# Patient Record
Sex: MALE | Race: OTHER | HISPANIC OR LATINO | Employment: FULL TIME | URBAN - METROPOLITAN AREA
[De-identification: names, ages, dates, MRNs, and addresses within clinical notes are randomized per-mention and may not be internally consistent; named-entity substitution may affect disease eponyms.]

---

## 2019-12-30 ENCOUNTER — OFFICE VISIT (OUTPATIENT)
Dept: UROLOGY | Facility: MEDICAL CENTER | Age: 24
End: 2019-12-30

## 2019-12-30 VITALS
WEIGHT: 177 LBS | SYSTOLIC BLOOD PRESSURE: 136 MMHG | BODY MASS INDEX: 27.78 KG/M2 | HEART RATE: 65 BPM | DIASTOLIC BLOOD PRESSURE: 90 MMHG | HEIGHT: 67 IN

## 2019-12-30 DIAGNOSIS — N45.1 EPIDIDYMITIS: ICD-10-CM

## 2019-12-30 DIAGNOSIS — N48.9 PENILE LESION: Primary | ICD-10-CM

## 2019-12-30 PROCEDURE — 99204 OFFICE O/P NEW MOD 45 MIN: CPT | Performed by: UROLOGY

## 2019-12-30 RX ORDER — DOXYCYCLINE HYCLATE 100 MG/1
100 CAPSULE ORAL EVERY 12 HOURS SCHEDULED
Qty: 20 CAPSULE | Refills: 0 | Status: SHIPPED | OUTPATIENT
Start: 2019-12-30 | End: 2020-01-09

## 2020-01-03 NOTE — PROGRESS NOTES
HISTORY:    Patient has small red areas on the penile shaft which appear to be quite dry, two 3 mm in diameter, as they heal the skin flakes off  No ulcers, drainage, pain, infection  No prior  history, but intermittent left scrotal pain also for six months            ASSESSMENT / PLAN:    Unclear cause of this nonspecific inflammatory condition  Patient has been putting hydrocortisone, and he says it is about 50% better  He will continue doing that about one more week, it is still present he will need Dermatology evaluation, he has seen them before  Doxycycline for possible epididymitis  I am not sure if this is true diagnosis because pain is been there six months, but he is tender, so will treat as such  The following portions of the patient's history were reviewed and updated as appropriate: allergies, current medications, past family history, past medical history, past social history, past surgical history and problem list     Review of Systems   All other systems reviewed and are negative  Objective:     Physical Exam   Constitutional: He appears well-developed and well-nourished  Genitourinary:   Genitourinary Comments: Penis testes carefully examined:       patchy erythematous areas on  penile skin as described above    Testes normal, mild thickening upper pole left epididymis possible epididymitis, although has been there for six months  No results found for: PSA]  No results found for: BUN  No results found for: CREATININE  No components found for: CBC      Patient Active Problem List   Diagnosis    Penile lesion    Epididymitis        Diagnoses and all orders for this visit:    Penile lesion    Epididymitis  -     doxycycline hyclate (VIBRAMYCIN) 100 mg capsule; Take 1 capsule (100 mg total) by mouth every 12 (twelve) hours for 10 days           Patient ID: Mike Ulloa is a 25 y o  male        Current Outpatient Medications:     doxycycline hyclate (VIBRAMYCIN) 100 mg capsule, Take 1 capsule (100 mg total) by mouth every 12 (twelve) hours for 10 days, Disp: 20 capsule, Rfl: 0    History reviewed  No pertinent past medical history  History reviewed  No pertinent surgical history      Social History

## 2020-04-02 ENCOUNTER — NURSE TRIAGE (OUTPATIENT)
Dept: OTHER | Facility: OTHER | Age: 25
End: 2020-04-02

## 2020-04-02 ENCOUNTER — TELEPHONE (OUTPATIENT)
Dept: LAB | Facility: HOSPITAL | Age: 25
End: 2020-04-02

## 2021-08-08 ENCOUNTER — HOSPITAL ENCOUNTER (EMERGENCY)
Facility: HOSPITAL | Age: 26
Discharge: HOME | End: 2021-08-08
Attending: EMERGENCY MEDICINE

## 2021-08-08 VITALS
SYSTOLIC BLOOD PRESSURE: 139 MMHG | RESPIRATION RATE: 15 BRPM | OXYGEN SATURATION: 98 % | BODY MASS INDEX: 27.7 KG/M2 | HEART RATE: 89 BPM | HEIGHT: 69 IN | TEMPERATURE: 98.4 F | DIASTOLIC BLOOD PRESSURE: 89 MMHG | WEIGHT: 187 LBS

## 2021-08-08 DIAGNOSIS — S09.93XA DENTAL INJURY, INITIAL ENCOUNTER: Primary | ICD-10-CM

## 2021-08-08 PROCEDURE — 99284 EMERGENCY DEPT VISIT MOD MDM: CPT | Performed by: EMERGENCY MEDICINE

## 2021-08-08 PROCEDURE — 99282 EMERGENCY DEPT VISIT SF MDM: CPT

## 2021-08-08 RX ORDER — IBUPROFEN 600 MG/1
600 TABLET ORAL ONCE
Status: COMPLETED | OUTPATIENT
Start: 2021-08-08 | End: 2021-08-08

## 2021-08-08 RX ORDER — OXYCODONE HYDROCHLORIDE 5 MG/1
5 TABLET ORAL EVERY 6 HOURS PRN
Qty: 6 TABLET | Refills: 0 | Status: SHIPPED | OUTPATIENT
Start: 2021-08-08 | End: 2021-08-08 | Stop reason: ALTCHOICE

## 2021-08-08 RX ORDER — OXYCODONE HYDROCHLORIDE 5 MG/1
5 TABLET ORAL ONCE
Status: COMPLETED | OUTPATIENT
Start: 2021-08-08 | End: 2021-08-08

## 2021-08-08 RX ORDER — OXYCODONE HYDROCHLORIDE 5 MG/1
5 TABLET ORAL EVERY 6 HOURS PRN
Qty: 6 TABLET | Refills: 0 | Status: SHIPPED | OUTPATIENT
Start: 2021-08-08 | End: 2021-08-11

## 2021-08-08 RX ADMIN — IBUPROFEN 600 MG: 600 TABLET, FILM COATED ORAL at 19:36

## 2021-08-08 RX ADMIN — OXYCODONE 5 MG: 5 TABLET ORAL at 19:36
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
## 2021-08-08 NOTE — DISCHARGE INSTRUCTIONS
Follow up with dentistry in the AM, and return to the emergency department for new or worsening symptoms

## 2024-09-06 ENCOUNTER — HOSPITAL ENCOUNTER (EMERGENCY)
Facility: HOSPITAL | Age: 29
Discharge: HOME | End: 2024-09-07
Attending: EMERGENCY MEDICINE
Payer: COMMERCIAL

## 2024-09-06 VITALS
OXYGEN SATURATION: 100 % | TEMPERATURE: 98.1 F | RESPIRATION RATE: 16 BRPM | DIASTOLIC BLOOD PRESSURE: 84 MMHG | HEART RATE: 66 BPM | SYSTOLIC BLOOD PRESSURE: 140 MMHG

## 2024-09-06 DIAGNOSIS — S05.00XA CONJUNCTIVAL ABRASION: Primary | ICD-10-CM

## 2024-09-06 PROCEDURE — 99282 EMERGENCY DEPT VISIT SF MDM: CPT

## 2024-09-07 PROCEDURE — 99284 EMERGENCY DEPT VISIT MOD MDM: CPT | Performed by: EMERGENCY MEDICINE

## 2024-09-07 RX ORDER — CIPROFLOXACIN HYDROCHLORIDE 3.5 MG/ML
1 SOLUTION/ DROPS TOPICAL
Qty: 5 ML | Refills: 0 | Status: SHIPPED | OUTPATIENT
Start: 2024-09-07 | End: 2024-09-07

## 2024-09-07 RX ORDER — TETRACAINE HYDROCHLORIDE 5 MG/ML
1 SOLUTION OPHTHALMIC ONCE
Status: COMPLETED | OUTPATIENT
Start: 2024-09-07 | End: 2024-09-07

## 2024-09-07 RX ORDER — CIPROFLOXACIN HYDROCHLORIDE 3.5 MG/ML
1 SOLUTION/ DROPS TOPICAL
Qty: 4.2 ML | Refills: 0 | Status: SHIPPED | OUTPATIENT
Start: 2024-09-07 | End: 2024-09-14

## 2024-09-07 RX ADMIN — TETRACAINE HYDROCHLORIDE 1 DROP: 5 SOLUTION OPHTHALMIC at 00:53

## 2024-09-07 RX ADMIN — FLUORESCEIN SODIUM 1 STRIP: 1 STRIP OPHTHALMIC at 00:53

## 2024-09-07 NOTE — DISCHARGE INSTRUCTIONS
You were seen in the emergency department for right eye pain.  There is no foreign body in your eye.  It appears as if you scratched the top layer of your eye.    A prescription for antibiotic eyedrops has been sent to [facility]. Please use 1 drop in the right eye every 2 hours that you are awake.     A referral for an eye doctor has been placed. Please call to make an appointment.    Immediately return to the emergency department if you develop a fever, increased pain or swelling, vision changes, or any new or concerning symptoms.

## 2024-09-07 NOTE — ED PROVIDER NOTES
History  Chief Complaint   Patient presents with    Eye Pain     Pt reports cutting wood this morning and got something in his right eye. Swelling and redness noted in triage     29-year-old previously healthy male presents for right eye pain and swelling.  He was chopping wood this morning when he felt something hit his eye.  He states he did pull off a piece of wood that was on his eye.  The pain and the swelling worsened throughout the day which prompted him to come in.  He has had no changes to his vision.  Denies wearing contact lenses.      Eye Pain  Associated symptoms: no abdominal pain, no chest pain, no cough, no ear pain, no fever, no headaches, no rash, no shortness of breath, no sore throat and no vomiting        None       History reviewed. No pertinent past medical history.    History reviewed. No pertinent surgical history.    History reviewed. No pertinent family history.  I have reviewed and agree with the history as documented.    E-Cigarette/Vaping    E-Cigarette Use Never User      E-Cigarette/Vaping Substances     Social History     Tobacco Use    Smoking status: Former     Current packs/day: 0.00     Types: Cigarettes     Quit date: 2015     Years since quittin.6    Smokeless tobacco: Never   Vaping Use    Vaping status: Never Used   Substance Use Topics    Alcohol use: Yes    Drug use: Never        Review of Systems   Constitutional:  Negative for chills and fever.   HENT:  Negative for ear pain and sore throat.    Eyes:  Positive for pain and redness. Negative for photophobia and visual disturbance.   Respiratory:  Negative for cough and shortness of breath.    Cardiovascular:  Negative for chest pain and palpitations.   Gastrointestinal:  Negative for abdominal pain and vomiting.   Genitourinary:  Negative for dysuria and hematuria.   Musculoskeletal:  Negative for arthralgias and back pain.   Skin:  Negative for color change and rash.   Neurological:  Negative for seizures,  syncope and headaches.   All other systems reviewed and are negative.      Physical Exam  ED Triage Vitals [09/06/24 2328]   Temperature Pulse Respirations Blood Pressure SpO2   98.1 °F (36.7 °C) 66 16 140/84 100 %      Temp Source Heart Rate Source Patient Position - Orthostatic VS BP Location FiO2 (%)   Temporal Monitor Sitting Left arm --      Pain Score       5             Orthostatic Vital Signs  Vitals:    09/06/24 2328   BP: 140/84   Pulse: 66   Patient Position - Orthostatic VS: Sitting       Physical Exam  Vitals and nursing note reviewed.   Constitutional:       General: He is not in acute distress.     Appearance: He is well-developed.   HENT:      Head: Normocephalic and atraumatic.   Eyes:      General: Lids are everted, no foreign bodies appreciated. Vision grossly intact.         Right eye: No discharge.         Left eye: No foreign body or discharge.      Extraocular Movements: Extraocular movements intact.      Conjunctiva/sclera:      Right eye: Right conjunctiva is injected. No exudate or hemorrhage.     Slit lamp exam:     Right eye: No corneal flare, corneal ulcer, foreign body, hyphema or hypopyon.        Comments: Patient has hyperpigmentation on sclera of his right eye, which he states has been there chronically; lateral aspect of sclera is injected; no foreign object visualized under lids, right eyelid is swollen   Cardiovascular:      Rate and Rhythm: Normal rate and regular rhythm.      Heart sounds: No murmur heard.  Pulmonary:      Effort: Pulmonary effort is normal. No respiratory distress.      Breath sounds: Normal breath sounds.   Abdominal:      Palpations: Abdomen is soft.      Tenderness: There is no abdominal tenderness.   Musculoskeletal:         General: No swelling.      Cervical back: Neck supple.   Skin:     General: Skin is warm and dry.      Capillary Refill: Capillary refill takes less than 2 seconds.   Neurological:      Mental Status: He is alert.   Psychiatric:          Mood and Affect: Mood normal.         ED Medications  Medications   tetracaine 0.5 % ophthalmic solution 1 drop (1 drop Right Eye Given by Other 9/7/24 0053)   fluorescein sodium sterile ophthalmic strip 1 strip (1 strip Right Eye Given by Other 9/7/24 0053)       Diagnostic Studies  Results Reviewed       None                   No orders to display         Procedures  Procedures      ED Course                             SBIRT 20yo+      Flowsheet Row Most Recent Value   Initial Alcohol Screen: US AUDIT-C     1. How often do you have a drink containing alcohol? 0 Filed at: 09/06/2024 2329   2. How many drinks containing alcohol do you have on a typical day you are drinking?  0 Filed at: 09/06/2024 2329   3a. Male UNDER 65: How often do you have five or more drinks on one occasion? 0 Filed at: 09/06/2024 2329   Audit-C Score 0 Filed at: 09/06/2024 2329   MIRIAM: How many times in the past year have you...    Used an illegal drug or used a prescription medication for non-medical reasons? Never Filed at: 09/06/2024 2329                  Medical Decision Making  Patient is a 29 y.o. male who presents to the ED with right eye pain.    Vital signs within normal limits. On exam injected right conjunctive of, no foreign body visualized, fluorescein uptake on the lateral aspect of sclera, hyperpigmentation of right conjunctiva which has been there chronically.    History and physical exam most consistent with conjunctivia abrasion. However, differential diagnosis included but not limited to foreign body, corneal abrasion, corneal ulcer.  These are less likely due to physical exam findings.    Evaluated by with tetracaine and fluorescein, showed uptake on the lateral aspect of his right eye.  No evidence of foreign body on slit-lamp exam.    View ED course above for further discussion on patient workup.       All labs reviewed and utilized in the medical decision making process  All radiology studies independently viewed by me  "and interpreted by the radiologist.  I reviewed all testing with the patient.     Discussed with patient's the finding of the physical exam.  He will follow-up with ophthalmology.  A prescription for ciprofloxacin eyedrops was sent to his pharmacy.  Patient understands and agrees with plan.  All questions answered.    Disposition: Stable discharge with ophthalmology follow-up    Portions of the record may have been created with voice recognition software. Occasional wrong word or \"sound a like\" substitutions may have occurred due to the inherent limitations of voice recognition software. Read the chart carefully and recognize, using context, where substitutions have occurred.      Risk  Prescription drug management.          Disposition  Final diagnoses:   Conjunctival abrasion     Time reflects when diagnosis was documented in both MDM as applicable and the Disposition within this note       Time User Action Codes Description Comment    9/7/2024  1:14 AM Jessy Nick [S05.00XA] Conjunctival abrasion           ED Disposition       ED Disposition   Discharge    Condition   Stable    Date/Time   Sat Sep 7, 2024 0121    Comment   Bobby Richards discharge to home/self care.                   Follow-up Information       Follow up With Specialties Details Why Contact Info Additional Information    [facility]  Establish care with a primary care physician if you do not already have one. [address] [facility] [address]  [phone] [facility], [address] [place], [place], [address]   [phone]    [facility] Emergency Department Emergency Medicine Go to  If symptoms worsen [address] [facility] [address]  [phone] [facility] Emergency Department, [address] [place], [place], [address]   [phone]            Discharge " Medication List as of 9/7/2024  1:21 AM        START taking these medications    Details   ciprofloxacin (CILOXAN) 0.3 % ophthalmic solution Administer 1 drop to the right eye every 2 (two) hours for 8 days, Starting Sat 9/7/2024, Until Sun 9/15/2024, Normal               PDMP Review       None             ED Provider  Attending physically available and evaluated Bobby Richards. I managed the patient along with the ED Attending.    Electronically Signed by           Jessy Nick DO  09/07/24 3338

## 2024-09-07 NOTE — ED ATTENDING ATTESTATION
9/6/2024  I, William Melgoza MD, saw and evaluated the patient. I have discussed the patient with the resident/non-physician practitioner and agree with the resident's/non-physician practitioner's findings, Plan of Care, and MDM as documented in the resident's/non-physician practitioner's note, except where noted. All available labs and Radiology studies were reviewed.  I was present for key portions of any procedure(s) performed by the resident/non-physician practitioner and I was immediately available to provide assistance.       At this point I agree with the current assessment done in the Emergency Department.  I have conducted an independent evaluation of this patient a history and physical is as follows:    ED Course     Patient presents for evaluation due to right eye pain.  Patient states that he was attempting to cut out a door jam on a door when a piece of wood flew into his eye.  He states that he removed the piece but has had ongoing symptoms.  He denies any visual complaints.  No additional complaints.  Exam: AAOx3, NAD, right conjunctiva with injection and small amount of fluorescein uptake in right lateral sclera, no corneal abrasion. A/P: Eye injury.  Will treat with antibiotic drops and have patient follow-up with ophthalmology.    Critical Care Time  Procedures